# Patient Record
Sex: MALE | Race: WHITE | NOT HISPANIC OR LATINO | Employment: UNEMPLOYED | URBAN - METROPOLITAN AREA
[De-identification: names, ages, dates, MRNs, and addresses within clinical notes are randomized per-mention and may not be internally consistent; named-entity substitution may affect disease eponyms.]

---

## 2023-10-30 ASSESSMENT — ENCOUNTER SYMPTOMS
WHEEZING: 0
CONSTITUTIONAL NEGATIVE: 1
SHORTNESS OF BREATH: 0

## 2023-10-30 NOTE — PROGRESS NOTES
Subjective   Patient ID: Nii Luque is a 32 y.o. male who presents for No chief complaint on file..  Last physical: 3/3/23  Does pt want flu shot?    Current sx  When did sx start?    Poc ua  Send urine out for culture    HPI  uti symptoms for   no dysuria, frequency, urgency, hematuria, lower abdominal pain, lower back pain, fever, chills, cloudy urine, odor to urine, small amount of urine when urinate, nausea, vomiting, incontinence  pain right now  pain at worst  last uti-  h/o kidney stones-    no itching, skin feels like it is burning, redness, rash, thick (thin or watery) vaginal d/c, irritation of genital area, swelling of genital area, painful IC  last yeast infection-  no recent abx    no fishy vaginal odor especially after IC or thin whitish-gray vaginal d/c  last bv infection-    selftxt:    std exposure-  new partner-  number of current partners-  number of partners in 12mon-  h/o stds-    lmp-    prostate exam  gc/chl      No care team member to display     Review of Systems   Constitutional: Negative.    Respiratory:  Negative for shortness of breath and wheezing.    Cardiovascular:  Negative for chest pain.     Objective   There were no vitals taken for this visit.   BP Readings from Last 3 Encounters:   03/03/23 129/80   06/07/22 128/84   11/19/21 132/84     Wt Readings from Last 3 Encounters:   03/03/23 142 kg (312 lb)   06/07/22 (!) 143 kg (315 lb 6 oz)   11/19/21 134 kg (296 lb 6.4 oz)       Physical Exam  Constitutional:       General: He is not in acute distress.     Appearance: Normal appearance.   Neurological:      Mental Status: He is alert.     Assessment/Plan   {Assess/PlanSmartLinks:59551}

## 2023-10-31 ENCOUNTER — APPOINTMENT (OUTPATIENT)
Dept: PRIMARY CARE | Facility: CLINIC | Age: 33
End: 2023-10-31
Payer: COMMERCIAL

## 2023-10-31 ENCOUNTER — TELEPHONE (OUTPATIENT)
Dept: PRIMARY CARE | Facility: CLINIC | Age: 33
End: 2023-10-31

## 2023-10-31 DIAGNOSIS — F41.9 ANXIETY: Primary | ICD-10-CM

## 2023-10-31 NOTE — TELEPHONE ENCOUNTER
Pt found out he has Covid on Monday and he takes Prisciq 100 mg and he has had bad diarrhea. Can she call in temporary prescription for anxiety? He thinks medicine is messing him up. He is dealing with Covid, diarrhea and anxiety. Bad situation. Please let him know as soon as possible,

## 2023-11-01 RX ORDER — BUSPIRONE HYDROCHLORIDE 5 MG/1
2.5-5 TABLET ORAL 3 TIMES DAILY PRN
Qty: 90 TABLET | Refills: 0 | Status: SHIPPED | OUTPATIENT
Start: 2023-11-01 | End: 2023-11-06 | Stop reason: ALTCHOICE

## 2023-11-01 NOTE — TELEPHONE ENCOUNTER
I can send in buspar for anxiety  When did pts covid sx start?   if Friday or later, I can see pt for a virtual visit to prescribe a med for covid-paxlovid.

## 2023-11-01 NOTE — TELEPHONE ENCOUNTER
Patient informed, does not want appointment says he was offered the covid med and didn't want to take it.

## 2023-11-06 ENCOUNTER — LAB (OUTPATIENT)
Dept: LAB | Facility: LAB | Age: 33
End: 2023-11-06
Payer: COMMERCIAL

## 2023-11-06 ENCOUNTER — OFFICE VISIT (OUTPATIENT)
Dept: PRIMARY CARE | Facility: CLINIC | Age: 33
End: 2023-11-06
Payer: COMMERCIAL

## 2023-11-06 VITALS
OXYGEN SATURATION: 97 % | TEMPERATURE: 97.5 F | BODY MASS INDEX: 33.96 KG/M2 | WEIGHT: 242.6 LBS | HEIGHT: 71 IN | HEART RATE: 67 BPM | SYSTOLIC BLOOD PRESSURE: 118 MMHG | DIASTOLIC BLOOD PRESSURE: 80 MMHG

## 2023-11-06 DIAGNOSIS — R19.7 DIARRHEA, UNSPECIFIED TYPE: Primary | ICD-10-CM

## 2023-11-06 DIAGNOSIS — F51.01 PRIMARY INSOMNIA: ICD-10-CM

## 2023-11-06 DIAGNOSIS — F41.0 PANIC ATTACKS: ICD-10-CM

## 2023-11-06 DIAGNOSIS — F41.9 ANXIETY: ICD-10-CM

## 2023-11-06 DIAGNOSIS — R53.83 FATIGUE, UNSPECIFIED TYPE: ICD-10-CM

## 2023-11-06 PROBLEM — E66.01 MORBID OBESITY (MULTI): Status: ACTIVE | Noted: 2023-11-06

## 2023-11-06 PROBLEM — F33.0 MILD EPISODE OF RECURRENT MAJOR DEPRESSIVE DISORDER (CMS-HCC): Status: ACTIVE | Noted: 2023-11-06

## 2023-11-06 PROBLEM — K21.9 GASTROESOPHAGEAL REFLUX DISEASE WITHOUT ESOPHAGITIS: Status: ACTIVE | Noted: 2023-11-06

## 2023-11-06 PROBLEM — K60.2 ANAL FISSURE: Status: ACTIVE | Noted: 2023-11-06

## 2023-11-06 PROCEDURE — 80053 COMPREHEN METABOLIC PANEL: CPT

## 2023-11-06 PROCEDURE — 1036F TOBACCO NON-USER: CPT | Performed by: NURSE PRACTITIONER

## 2023-11-06 PROCEDURE — 36415 COLL VENOUS BLD VENIPUNCTURE: CPT

## 2023-11-06 PROCEDURE — 99214 OFFICE O/P EST MOD 30 MIN: CPT | Performed by: NURSE PRACTITIONER

## 2023-11-06 PROCEDURE — 85025 COMPLETE CBC W/AUTO DIFF WBC: CPT

## 2023-11-06 RX ORDER — ALPRAZOLAM 0.25 MG/1
0.25 TABLET ORAL 3 TIMES DAILY PRN
Qty: 18 TABLET | Refills: 0 | Status: SHIPPED | OUTPATIENT
Start: 2023-11-06 | End: 2023-11-12

## 2023-11-06 RX ORDER — TRAZODONE HYDROCHLORIDE 50 MG/1
50-100 TABLET ORAL NIGHTLY PRN
Qty: 60 TABLET | Refills: 0 | Status: SHIPPED | OUTPATIENT
Start: 2023-11-06 | End: 2023-12-13 | Stop reason: ALTCHOICE

## 2023-11-06 RX ORDER — BISMUTH SUBSALICYLATE 262 MG
1 TABLET,CHEWABLE ORAL DAILY
COMMUNITY
End: 2023-11-06 | Stop reason: ALTCHOICE

## 2023-11-06 RX ORDER — DESVENLAFAXINE 100 MG/1
100 TABLET, EXTENDED RELEASE ORAL DAILY
COMMUNITY
End: 2023-12-13 | Stop reason: SDUPTHER

## 2023-11-06 ASSESSMENT — PATIENT HEALTH QUESTIONNAIRE - PHQ9
SUM OF ALL RESPONSES TO PHQ QUESTIONS 1-9: 18
3. TROUBLE FALLING OR STAYING ASLEEP OR SLEEPING TOO MUCH: MORE THAN HALF THE DAYS
10. IF YOU CHECKED OFF ANY PROBLEMS, HOW DIFFICULT HAVE THESE PROBLEMS MADE IT FOR YOU TO DO YOUR WORK, TAKE CARE OF THINGS AT HOME, OR GET ALONG WITH OTHER PEOPLE: VERY DIFFICULT
2. FEELING DOWN, DEPRESSED OR HOPELESS: MORE THAN HALF THE DAYS
4. FEELING TIRED OR HAVING LITTLE ENERGY: MORE THAN HALF THE DAYS
9. THOUGHTS THAT YOU WOULD BE BETTER OFF DEAD, OR OF HURTING YOURSELF: MORE THAN HALF THE DAYS
6. FEELING BAD ABOUT YOURSELF - OR THAT YOU ARE A FAILURE OR HAVE LET YOURSELF OR YOUR FAMILY DOWN: MORE THAN HALF THE DAYS
8. MOVING OR SPEAKING SO SLOWLY THAT OTHER PEOPLE COULD HAVE NOTICED. OR THE OPPOSITE, BEING SO FIGETY OR RESTLESS THAT YOU HAVE BEEN MOVING AROUND A LOT MORE THAN USUAL: MORE THAN HALF THE DAYS
5. POOR APPETITE OR OVEREATING: MORE THAN HALF THE DAYS
1. LITTLE INTEREST OR PLEASURE IN DOING THINGS: MORE THAN HALF THE DAYS
SUM OF ALL RESPONSES TO PHQ9 QUESTIONS 1 AND 2: 4
7. TROUBLE CONCENTRATING ON THINGS, SUCH AS READING THE NEWSPAPER OR WATCHING TELEVISION: MORE THAN HALF THE DAYS

## 2023-11-06 ASSESSMENT — ENCOUNTER SYMPTOMS
WHEEZING: 0
CONSTITUTIONAL NEGATIVE: 1
SHORTNESS OF BREATH: 0
DIARRHEA: 1

## 2023-11-06 ASSESSMENT — ANXIETY QUESTIONNAIRES
4. TROUBLE RELAXING: SEVERAL DAYS
5. BEING SO RESTLESS THAT IT IS HARD TO SIT STILL: SEVERAL DAYS
6. BECOMING EASILY ANNOYED OR IRRITABLE: MORE THAN HALF THE DAYS
7. FEELING AFRAID AS IF SOMETHING AWFUL MIGHT HAPPEN: MORE THAN HALF THE DAYS
2. NOT BEING ABLE TO STOP OR CONTROL WORRYING: SEVERAL DAYS
GAD7 TOTAL SCORE: 9
1. FEELING NERVOUS, ANXIOUS, OR ON EDGE: SEVERAL DAYS
IF YOU CHECKED OFF ANY PROBLEMS ON THIS QUESTIONNAIRE, HOW DIFFICULT HAVE THESE PROBLEMS MADE IT FOR YOU TO DO YOUR WORK, TAKE CARE OF THINGS AT HOME, OR GET ALONG WITH OTHER PEOPLE: EXTREMELY DIFFICULT
3. WORRYING TOO MUCH ABOUT DIFFERENT THINGS: SEVERAL DAYS

## 2023-11-06 NOTE — PROGRESS NOTES
Subjective   Patient ID: Nii Luque is a 32 y.o. male who presents for Follow-up.  Last physical: 3/3/23    Does pt want flu shot? No     When did the diarrhea start? Monday   Is he still having diarrhea yes  *When did the covid sx start? Sx started 8d ago; Tested positive on Monday, negative the following Friday.  Any sx left of the covid? Chills and brain fog.   Did the buspar help the anxiety? No   How beers per wk is he drinking? Does not drink anymore quit may 9th   *When did pt quit smoking?   *When did he start smoking?   How much did he used to smoke per day?   Phq9= 18  , gad7=9-situational elev numbers    HPI  10/30/23 dx with covid; still has chills and brain fog  Started pristiq 100mg 1 a day in 2020; started buspar for anxiety  Has diarrhea; started 1wk ago, yesterday nl stool but diarrhea again; no bld in stool; occas stomach pain; chills this am; no fever  Hard to sleep-seroquel helped in past  Stressful at work  Buspar giving pt brain zaps  Last marijuana 2wks ago    no suicidal thoughts or plans at this time.     Previous mental health txt-xanax and klonopin in the past    no delusions, hallucinations, uncontrollable/purposeless mvmts, or fixed/inflexible posture  no extreme mood swings that include emotional highs and lows,      Abnormally upbeat, jumpy or wired,      Increased activity, energy or agitation,      Exaggerated sense of well-being and self-confidence (euphoria),      Irritable,      Decreased need for sleep,      Unusual talkativeness,      Racing thoughts,      Distractibility,      Poor decision-making - for example, going on buying sprees, taking sexual risks or making foolish investments           affects sleep, energy, activity, judgment, behavior and the ability to think clearly.    sx younger children- may include sadness, irritability, temper tantrums, aggression, clinginess, worry, aches and pains, refusing to go to school, or being underweight.  sx teens-may include  sadness, irritability, feeling negative and worthless, anger, poor performance or poor attendance at school, feeling misunderstood and extremely sensitive, using recreational drugs or alcohol, eating or sleeping too much, self-harm, loss of interest in normal activities, and avoidance of social interaction.  sx older adults-Memory difficulties or personality changes    sx affecting day-to-day activities, such as work, school, social activities or relationships with others-  anyone noticing your sx-    last labs-  not on prednisone or BCP  no new meds or otc meds    Social history:  Marital status  Children  Lives with  Education  Job  Grades in school  Trouble at school    Substances:  Alcohol-  illegal drug use-  Tobacco-  Caffeine-    Past medical history:  h/o trauma  Pain  chronic dis-    No care team member to display     Review of Systems   Constitutional: Negative.    Respiratory:  Negative for shortness of breath and wheezing.    Cardiovascular:  Negative for chest pain.   Gastrointestinal:  Positive for diarrhea.   Neurological:         Insomnia     Psychiatric/Behavioral:          Anxiety       Objective   Visit Vitals  /90   Pulse 67   Temp 36.4 °C (97.5 °F)      BP Readings from Last 3 Encounters:   11/06/23 123/90   03/03/23 129/80   06/07/22 128/84     Wt Readings from Last 3 Encounters:   11/06/23 110 kg (242 lb 9.6 oz)   03/03/23 142 kg (312 lb)   06/07/22 (!) 143 kg (315 lb 6 oz)       Physical Exam  Constitutional:       General: He is not in acute distress.     Appearance: Normal appearance.   Neurological:      Mental Status: He is alert.         Assessment/Plan   Diagnoses and all orders for this visit:  Diarrhea, unspecified type  -     Stool Pathogen Panel, PCR; Future  -     Ova/Para + Giardia/Cryptosporidium Antigen; Future  -     C. difficile, PCR  Fatigue, unspecified type  -     Comprehensive Metabolic Panel; Future  -     CBC and Auto Differential; Future  Anxiety  Primary  insomnia  Panic attacks  -     ALPRAZolam (Xanax) 0.25 mg tablet; Take 1 tablet (0.25 mg) by mouth 3 times a day as needed for anxiety for up to 6 days.  Other orders  -     Follow Up In Primary Care - Health Maintenance; Future

## 2023-11-06 NOTE — PATIENT INSTRUCTIONS
Trazodone -start 1 tab, then 1 1/2 then 2 tabs  Xanax   Continue pristiq  Next -psychiatrist    Stool culture including cdif    Labs today    Return in march for wellness appt      I will communicate with you via [employer] regarding messages and results. If you need help with this, you can call the support line at [phone].    IT WAS A PLEASURE TO SEE YOU TODAY. THANK YOU FOR CHOOSING US FOR YOUR HEALTHCARE NEEDS.

## 2023-11-07 LAB
ALBUMIN SERPL BCP-MCNC: 5 G/DL (ref 3.4–5)
ALP SERPL-CCNC: 94 U/L (ref 33–120)
ALT SERPL W P-5'-P-CCNC: 44 U/L (ref 10–52)
ANION GAP SERPL CALC-SCNC: 16 MMOL/L (ref 10–20)
AST SERPL W P-5'-P-CCNC: 21 U/L (ref 9–39)
BASOPHILS # BLD AUTO: 0.01 X10*3/UL (ref 0–0.1)
BASOPHILS NFR BLD AUTO: 0.2 %
BILIRUB SERPL-MCNC: 0.7 MG/DL (ref 0–1.2)
BUN SERPL-MCNC: 13 MG/DL (ref 6–23)
CALCIUM SERPL-MCNC: 10.1 MG/DL (ref 8.6–10.6)
CHLORIDE SERPL-SCNC: 104 MMOL/L (ref 98–107)
CO2 SERPL-SCNC: 25 MMOL/L (ref 21–32)
CREAT SERPL-MCNC: 1.01 MG/DL (ref 0.5–1.3)
EOSINOPHIL # BLD AUTO: 0.17 X10*3/UL (ref 0–0.7)
EOSINOPHIL NFR BLD AUTO: 2.6 %
ERYTHROCYTE [DISTWIDTH] IN BLOOD BY AUTOMATED COUNT: 13.3 % (ref 11.5–14.5)
GFR SERPL CREATININE-BSD FRML MDRD: >90 ML/MIN/1.73M*2
GLUCOSE SERPL-MCNC: 100 MG/DL (ref 74–99)
HCT VFR BLD AUTO: 49.2 % (ref 41–52)
HGB BLD-MCNC: 16.1 G/DL (ref 13.5–17.5)
IMM GRANULOCYTES # BLD AUTO: 0.01 X10*3/UL (ref 0–0.7)
IMM GRANULOCYTES NFR BLD AUTO: 0.2 % (ref 0–0.9)
LYMPHOCYTES # BLD AUTO: 1.64 X10*3/UL (ref 1.2–4.8)
LYMPHOCYTES NFR BLD AUTO: 25.2 %
MCH RBC QN AUTO: 30.3 PG (ref 26–34)
MCHC RBC AUTO-ENTMCNC: 32.7 G/DL (ref 32–36)
MCV RBC AUTO: 93 FL (ref 80–100)
MONOCYTES # BLD AUTO: 0.44 X10*3/UL (ref 0.1–1)
MONOCYTES NFR BLD AUTO: 6.7 %
NEUTROPHILS # BLD AUTO: 4.25 X10*3/UL (ref 1.2–7.7)
NEUTROPHILS NFR BLD AUTO: 65.1 %
NRBC BLD-RTO: 0 /100 WBCS (ref 0–0)
PLATELET # BLD AUTO: 258 X10*3/UL (ref 150–450)
POTASSIUM SERPL-SCNC: 4.1 MMOL/L (ref 3.5–5.3)
PROT SERPL-MCNC: 7.7 G/DL (ref 6.4–8.2)
RBC # BLD AUTO: 5.31 X10*6/UL (ref 4.5–5.9)
SODIUM SERPL-SCNC: 141 MMOL/L (ref 136–145)
WBC # BLD AUTO: 6.5 X10*3/UL (ref 4.4–11.3)

## 2023-11-09 ENCOUNTER — TELEPHONE (OUTPATIENT)
Dept: PRIMARY CARE | Facility: CLINIC | Age: 33
End: 2023-11-09
Payer: COMMERCIAL

## 2023-11-09 DIAGNOSIS — F33.0 MILD EPISODE OF RECURRENT MAJOR DEPRESSIVE DISORDER (CMS-HCC): ICD-10-CM

## 2023-11-09 DIAGNOSIS — F51.01 PRIMARY INSOMNIA: Primary | ICD-10-CM

## 2023-11-09 DIAGNOSIS — F41.0 PANIC ATTACKS: ICD-10-CM

## 2023-11-09 DIAGNOSIS — F41.9 ANXIETY: ICD-10-CM

## 2023-11-09 RX ORDER — QUETIAPINE FUMARATE 25 MG/1
25 TABLET, FILM COATED ORAL NIGHTLY
Qty: 30 TABLET | Refills: 1 | Status: SHIPPED | OUTPATIENT
Start: 2023-11-09 | End: 2023-12-13 | Stop reason: ALTCHOICE

## 2023-11-09 NOTE — TELEPHONE ENCOUNTER
Stop trazodone  Start seroquel 25mg at bedtime  I put in referral to psychiatry because I feel this is starting to get beyond primary care medication. Pt can call:  [facility]  [address]  [phone]  To schedule an appt.
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
## 2023-11-09 NOTE — TELEPHONE ENCOUNTER
Patient is having trouble sleeping.  Needs something to help him sleep.  [facility] in [place].

## 2023-12-13 ENCOUNTER — OFFICE VISIT (OUTPATIENT)
Dept: PRIMARY CARE | Facility: CLINIC | Age: 33
End: 2023-12-13
Payer: COMMERCIAL

## 2023-12-13 DIAGNOSIS — F41.9 ANXIETY: Primary | ICD-10-CM

## 2023-12-13 DIAGNOSIS — F33.0 MILD EPISODE OF RECURRENT MAJOR DEPRESSIVE DISORDER (CMS-HCC): ICD-10-CM

## 2023-12-13 PROCEDURE — 1036F TOBACCO NON-USER: CPT | Performed by: NURSE PRACTITIONER

## 2023-12-13 PROCEDURE — 99213 OFFICE O/P EST LOW 20 MIN: CPT | Performed by: NURSE PRACTITIONER

## 2023-12-13 RX ORDER — LURASIDONE HYDROCHLORIDE 20 MG/1
TABLET, FILM COATED ORAL
Qty: 90 TABLET | Refills: 0 | Status: SHIPPED | OUTPATIENT
Start: 2023-12-13

## 2023-12-13 RX ORDER — HYDROXYZINE HYDROCHLORIDE 50 MG/1
50 TABLET, FILM COATED ORAL 3 TIMES DAILY PRN
COMMUNITY
Start: 2023-11-17 | End: 2023-12-13 | Stop reason: SDUPTHER

## 2023-12-13 RX ORDER — HYDROXYZINE HYDROCHLORIDE 50 MG/1
50 TABLET, FILM COATED ORAL 3 TIMES DAILY PRN
Qty: 90 TABLET | Refills: 0 | Status: SHIPPED | OUTPATIENT
Start: 2023-12-13

## 2023-12-13 RX ORDER — DESVENLAFAXINE 100 MG/1
100 TABLET, EXTENDED RELEASE ORAL DAILY
Qty: 90 TABLET | Refills: 0 | Status: SHIPPED | OUTPATIENT
Start: 2023-12-13

## 2023-12-13 RX ORDER — LURASIDONE HYDROCHLORIDE 20 MG/1
TABLET, FILM COATED ORAL
COMMUNITY
Start: 2023-11-17 | End: 2023-12-13 | Stop reason: SDUPTHER

## 2023-12-13 ASSESSMENT — ENCOUNTER SYMPTOMS
WHEEZING: 0
SHORTNESS OF BREATH: 0
CONSTITUTIONAL NEGATIVE: 1

## 2023-12-13 ASSESSMENT — PATIENT HEALTH QUESTIONNAIRE - PHQ9
2. FEELING DOWN, DEPRESSED OR HOPELESS: SEVERAL DAYS
SUM OF ALL RESPONSES TO PHQ9 QUESTIONS 1 AND 2: 1
1. LITTLE INTEREST OR PLEASURE IN DOING THINGS: NOT AT ALL
10. IF YOU CHECKED OFF ANY PROBLEMS, HOW DIFFICULT HAVE THESE PROBLEMS MADE IT FOR YOU TO DO YOUR WORK, TAKE CARE OF THINGS AT HOME, OR GET ALONG WITH OTHER PEOPLE: NOT DIFFICULT AT ALL

## 2023-12-13 NOTE — PROGRESS NOTES
Subjective   Patient ID: Nii Luque is a 33 y.o. male who presents for Follow-up.  Last physical:  3/3/23      HPI  Lost his job and insurance  Psychiatrist thinks he is bipolar  Put on latuda and hydroxyzine; sleepin better, more engergtic and thinking clearer  Still on pristiq; might go off this eventually  Insurance kicks in feb 2 for new job and needs meds thru march      No care team member to display     Review of Systems   Constitutional: Negative.    Respiratory:  Negative for shortness of breath and wheezing.    Cardiovascular:  Negative for chest pain.       Objective   There were no vitals taken for this visit.   BP Readings from Last 3 Encounters:   11/06/23 118/80   03/03/23 129/80   06/07/22 128/84     Wt Readings from Last 3 Encounters:   11/06/23 110 kg (242 lb 9.6 oz)   03/03/23 142 kg (312 lb)   06/07/22 (!) 143 kg (315 lb 6 oz)       Physical Exam  Constitutional:       General: He is not in acute distress.     Appearance: Normal appearance.   Neurological:      Mental Status: He is alert.         Assessment/Plan   Diagnoses and all orders for this visit:  Anxiety  -     desvenlafaxine 100 mg 24 hr tablet; Take 1 tablet (100 mg) by mouth once daily. Do not crush, chew, or split.  -     hydrOXYzine HCL (Atarax) 50 mg tablet; Take 1 tablet (50 mg) by mouth 3 times a day as needed for anxiety.  -     lurasidone (Latuda) 20 mg tablet; TAKE ONE TABLET BY MOUTH EVERY DAY IN THE EVENING with dinner  Mild episode of recurrent major depressive disorder (CMS/HCC)  -     desvenlafaxine 100 mg 24 hr tablet; Take 1 tablet (100 mg) by mouth once daily. Do not crush, chew, or split.  -     hydrOXYzine HCL (Atarax) 50 mg tablet; Take 1 tablet (50 mg) by mouth 3 times a day as needed for anxiety.  -     lurasidone (Latuda) 20 mg tablet; TAKE ONE TABLET BY MOUTH EVERY DAY IN THE EVENING with dinner

## 2023-12-13 NOTE — PATIENT INSTRUCTIONS
Refill meds thru march.  Pt will schedule with psychiatrist in march when has insurance    Return in march for wellness appt    I will communicate with you via [employer] regarding messages and results. If you need help with this, you can call the support line at [phone].    IT WAS A PLEASURE TO SEE YOU TODAY. THANK YOU FOR CHOOSING US FOR YOUR HEALTHCARE NEEDS.

## 2024-01-03 ENCOUNTER — TELEPHONE (OUTPATIENT)
Dept: PRIMARY CARE | Facility: CLINIC | Age: 34
End: 2024-01-03
Payer: COMMERCIAL

## 2024-01-03 DIAGNOSIS — F41.9 ANXIETY: Primary | ICD-10-CM

## 2024-01-03 DIAGNOSIS — F33.0 MILD EPISODE OF RECURRENT MAJOR DEPRESSIVE DISORDER (CMS-HCC): ICD-10-CM

## 2024-01-03 NOTE — TELEPHONE ENCOUNTER
Noted  Official referrals in for [facility] and [facility]  
Pt has a psychiatrist. He will need to work with him for a plan.  I can refer to a therapist if he needs one.  Is pt suicidal or homicidal?  
Pt is not suicidal or homicidal but I did advise to go to an ER if that changes.   Gave the numbers to the places referred.  He did have a psychiatrist but can no longer afford it at this time and states they misdiagnosed him as bipolar which he claims he is not.   
Pt states he's in a mental crisis and has loss of his job already due to it. He's trying to get help ASAP and wants referral and help to get his foot in the door for treatment.   
The patient is a 48y Male complaining of abdominal pain.

## 2024-04-03 ENCOUNTER — CLINICAL SUPPORT (OUTPATIENT)
Dept: PRIMARY CARE | Facility: CLINIC | Age: 34
End: 2024-04-03
Payer: COMMERCIAL

## 2024-04-03 DIAGNOSIS — Z79.899 MEDICATION MANAGEMENT: Primary | ICD-10-CM

## 2024-04-03 PROCEDURE — 93000 ELECTROCARDIOGRAM COMPLETE: CPT | Performed by: NURSE PRACTITIONER

## 2024-09-20 ENCOUNTER — PATIENT OUTREACH (OUTPATIENT)
Dept: PRIMARY CARE | Facility: CLINIC | Age: 34
End: 2024-09-20
Payer: COMMERCIAL

## 2024-09-20 NOTE — PROGRESS NOTES
Discharge Facility: [facility]  Discharge Diagnosis: Acute complication of myocardial infarction   Admission Date: 9/16/2024  Discharge Date: 9/19/2024    PCP Appointment Date: none  Specialist Appointment Date:    -cardiology 9/25/2024  -vascular medicine 10/3/2024    Hospital Encounter and Summary Linked: Yes    Future Appointments   Date Time Provider Department Center   9/25/2024 8:30 AM EKG[facility] MAIN [facility]   9/25/2024 9:00 AM Adrianne Mercedes, APRN.CNP [facility]   10/3/2024 2:00 PM He Soriano DO [facility]   10/22/2024 2:15 PM [facility]4 MAIN [facility]   10/22/2024 2:30 PM EKG[facility]1-4 MAIN [facility]   10/22/2024 3:00 PM Cayla Cano APRN.CNP [facility]     Two attempts were made to reach patient within two business days after discharge. Voicemail left with contact information for patient to call back with any non-emergent questions or concerns.

## 2024-10-01 ENCOUNTER — APPOINTMENT (OUTPATIENT)
Dept: PRIMARY CARE | Facility: CLINIC | Age: 34
End: 2024-10-01
Payer: COMMERCIAL

## 2024-10-03 ENCOUNTER — PATIENT OUTREACH (OUTPATIENT)
Dept: PRIMARY CARE | Facility: CLINIC | Age: 34
End: 2024-10-03
Payer: COMMERCIAL

## 2024-10-04 PROBLEM — F14.11: Status: ACTIVE | Noted: 2024-10-04

## 2024-10-04 PROBLEM — F10.21 H/O ALCOHOL DEPENDENCE (MULTI): Status: ACTIVE | Noted: 2024-10-04

## 2024-10-04 PROBLEM — Q68.1 CONGENITAL HAND DEFORMITY: Status: ACTIVE | Noted: 2024-10-04

## 2024-10-04 RX ORDER — METOPROLOL SUCCINATE 25 MG/1
25 TABLET, EXTENDED RELEASE ORAL DAILY
COMMUNITY

## 2024-10-04 RX ORDER — ATORVASTATIN CALCIUM 80 MG/1
80 TABLET, FILM COATED ORAL DAILY
COMMUNITY

## 2024-10-04 RX ORDER — LISINOPRIL 5 MG/1
5 TABLET ORAL DAILY
COMMUNITY

## 2024-10-04 RX ORDER — ASPIRIN 81 MG/1
81 TABLET ORAL DAILY
COMMUNITY

## 2024-10-04 ASSESSMENT — ENCOUNTER SYMPTOMS
CONSTITUTIONAL NEGATIVE: 1
SHORTNESS OF BREATH: 0
WHEEZING: 0

## 2024-10-05 NOTE — PROGRESS NOTES
Subjective   Patient ID: Nii Luque is a 33 y.o. male who presents for Hospital Follow-up.  Last physical: 3/3/23  Last labs- 9/2024 wbc high, 1 liver enz high, tsh nl, hdl 38, ldl 113, mg nl, A1c 5.2    Does pt want flu shot? No   In hospital for MI  Current sx-none   Did pt start cardiac rehab? No, cannot afford it   Is pt still vaping? No   Is he using the nicotine patch? Pouches   How many drinks of alcohol per day? 6 pack twice a month    Is pt having diarrhea still? No   Did he do the cdif test? No   Is he on seroquel?  Yes   If yes milligrams/how many times a day? 50 mg 1 a day at night   Any other questions or concerns that pt wants to discuss today? Left side abdominal pain right under rib , has been going on for a while before the MI, talked to them in the hospital and they thought maybe his spleen, he thinks maybe an ulcer.       HPI  9/16/24 went to ER for mid sternal cp radiating to jaw with dizziness and nausea  EKG showed st elevation  Cxr neg  Sl elev wbc, potassium 3.4, inr nl  Given heparin, brinlinta, aspirin and nitroglycerin  Took [facility]ef[facility] to [facility] [place] and admitted 9/16/24  Had ballooing and aspiration thrombectomy  Got ziopatch-event monitor  Discharged 9/19/24  Dx STEMI  S/p PTCA  On lipitor 80mg every day, lisinopril 5mg every day, toprol xl 25mg every day, brilinta 90mg bid, asa 81mg qd    Saw cardio ROSA Mercedes 9/25/24-ordered cdif test  Next cardio appt with ROSA Cano 10/22/24    Saw vascular dr-dr pandey -will get clotting factors blood test 1/10 or after    Current sx-diarrhea gone now; no cp, dizziness or nausea  Cdif test-not done since diarrhea went way    Left side abdominal pain right under rib yrs on off, has been going on for a while before the MI, talked to them in the hospital and they thought maybe his spleen, he thinks maybe an ulcer.     Walking  2 meals  Trying to do mediterranean diet  Buble water      No care team member to display     Review of Systems    Constitutional: Negative.    Respiratory:  Negative for shortness of breath and wheezing.    Cardiovascular:  Negative for chest pain.       Objective   Visit Vitals  /84   Pulse 77   Temp 37 °C (98.6 °F)      BP Readings from Last 3 Encounters:   10/07/24 125/84   11/06/23 118/80   11/05/23 128/88     Wt Readings from Last 3 Encounters:   10/07/24 133 kg (294 lb 3.2 oz)   11/06/23 110 kg (242 lb 9.6 oz)   11/05/23 111 kg (244 lb 14.9 oz)       Physical Exam  Constitutional:       General: He is not in acute distress.     Appearance: Normal appearance.   Neurological:      Mental Status: He is alert.       Assessment/Plan   Diagnoses and all orders for this visit:  ST elevation myocardial infarction (STEMI), unspecified artery (Multi)  S/P PTCA (percutaneous transluminal coronary angioplasty)  Left upper quadrant abdominal pain  -     Referral to Gastroenterology; Future  -     US abdomen complete; Future  Other orders  -     Follow Up In Primary Care - Health Maintenance; Future

## 2024-10-07 ENCOUNTER — HOSPITAL ENCOUNTER (OUTPATIENT)
Dept: RADIOLOGY | Facility: EXTERNAL LOCATION | Age: 34
Discharge: HOME | End: 2024-10-07

## 2024-10-07 ENCOUNTER — APPOINTMENT (OUTPATIENT)
Dept: PRIMARY CARE | Facility: CLINIC | Age: 34
End: 2024-10-07
Payer: COMMERCIAL

## 2024-10-07 VITALS
HEART RATE: 77 BPM | TEMPERATURE: 98.6 F | HEIGHT: 71 IN | DIASTOLIC BLOOD PRESSURE: 84 MMHG | BODY MASS INDEX: 41.19 KG/M2 | SYSTOLIC BLOOD PRESSURE: 125 MMHG | WEIGHT: 294.2 LBS | OXYGEN SATURATION: 98 %

## 2024-10-07 DIAGNOSIS — Z98.61 S/P PTCA (PERCUTANEOUS TRANSLUMINAL CORONARY ANGIOPLASTY): ICD-10-CM

## 2024-10-07 DIAGNOSIS — R10.12 LEFT UPPER QUADRANT ABDOMINAL PAIN: ICD-10-CM

## 2024-10-07 DIAGNOSIS — I21.3 ST ELEVATION MYOCARDIAL INFARCTION (STEMI), UNSPECIFIED ARTERY (MULTI): Primary | ICD-10-CM

## 2024-10-07 PROCEDURE — 3008F BODY MASS INDEX DOCD: CPT | Performed by: NURSE PRACTITIONER

## 2024-10-07 PROCEDURE — 1036F TOBACCO NON-USER: CPT | Performed by: NURSE PRACTITIONER

## 2024-10-07 PROCEDURE — 99215 OFFICE O/P EST HI 40 MIN: CPT | Performed by: NURSE PRACTITIONER

## 2024-10-07 RX ORDER — QUETIAPINE FUMARATE 100 MG/1
50-100 TABLET, FILM COATED ORAL
COMMUNITY
Start: 2024-08-15 | End: 2024-10-07 | Stop reason: ALTCHOICE

## 2024-10-07 RX ORDER — QUETIAPINE FUMARATE 50 MG/1
50 TABLET, FILM COATED ORAL NIGHTLY
COMMUNITY

## 2024-10-07 ASSESSMENT — PATIENT HEALTH QUESTIONNAIRE - PHQ9
10. IF YOU CHECKED OFF ANY PROBLEMS, HOW DIFFICULT HAVE THESE PROBLEMS MADE IT FOR YOU TO DO YOUR WORK, TAKE CARE OF THINGS AT HOME, OR GET ALONG WITH OTHER PEOPLE: NOT DIFFICULT AT ALL
SUM OF ALL RESPONSES TO PHQ9 QUESTIONS 1 AND 2: 1
1. LITTLE INTEREST OR PLEASURE IN DOING THINGS: NOT AT ALL
2. FEELING DOWN, DEPRESSED OR HOPELESS: SEVERAL DAYS

## 2024-10-07 NOTE — PATIENT INSTRUCTIONS
1800cal   Decr carbs and sugars  Decr fats  Incr veggies and fruits  Incr walking -goal 5d a wk 30min a day-about 2mi in 30min    Set up ultrasound abdomen  See gi     Keep follow up with cardio   Get clotting factor blood tests in jan    Return for physical      I will communicate with you via [employer] regarding messages and results. If you need help with this, you can call the support line at [phone].    IT WAS A PLEASURE TO SEE YOU TODAY. THANK YOU FOR CHOOSING US FOR YOUR HEALTHCARE NEEDS.

## 2024-11-06 ENCOUNTER — PATIENT OUTREACH (OUTPATIENT)
Dept: PRIMARY CARE | Facility: CLINIC | Age: 34
End: 2024-11-06
Payer: COMMERCIAL

## 2024-11-27 ENCOUNTER — PATIENT OUTREACH (OUTPATIENT)
Dept: PRIMARY CARE | Facility: CLINIC | Age: 34
End: 2024-11-27
Payer: COMMERCIAL

## 2025-01-08 ENCOUNTER — APPOINTMENT (OUTPATIENT)
Dept: PRIMARY CARE | Facility: CLINIC | Age: 35
End: 2025-01-08
Payer: COMMERCIAL